# Patient Record
Sex: FEMALE | Race: WHITE | NOT HISPANIC OR LATINO | URBAN - METROPOLITAN AREA
[De-identification: names, ages, dates, MRNs, and addresses within clinical notes are randomized per-mention and may not be internally consistent; named-entity substitution may affect disease eponyms.]

---

## 2023-08-22 LAB
HEPATITIS B VIRUS SURFACE AG PRESENCE IN SERUM: NONREACTIVE
HERPES SIMPLEX VIRUS 1 IGG: >8 INDEX
HERPES SIMPLEX VIRUS 2 IGG: <0.2 INDEX
HIV 1/ 2 AG/AB SCREEN: NONREACTIVE
SYPHILIS TOTAL AB: NONREACTIVE

## 2023-08-23 LAB
CHLAMYDIA TRACH., AMPLIFIED: NEGATIVE
N. GONORRHEA, AMPLIFIED: NEGATIVE

## 2023-08-29 LAB — HERPES SIMPLEX VIRUS I/II ANTIBODY, IGM: 1.15 IV

## 2024-10-02 PROBLEM — Z97.5 IUD (INTRAUTERINE DEVICE) IN PLACE: Status: ACTIVE | Noted: 2024-10-02

## 2024-10-02 PROBLEM — R87.610 ASCUS WITH POSITIVE HIGH RISK HPV CERVICAL: Status: ACTIVE | Noted: 2024-10-02

## 2024-10-02 PROBLEM — B00.2 ORAL HERPES: Status: ACTIVE | Noted: 2024-10-02

## 2024-10-02 PROBLEM — R87.810 ASCUS WITH POSITIVE HIGH RISK HPV CERVICAL: Status: ACTIVE | Noted: 2024-10-02

## 2024-10-02 PROBLEM — Z01.419 WELL WOMAN EXAM WITH ROUTINE GYNECOLOGICAL EXAM: Status: ACTIVE | Noted: 2024-10-02

## 2024-10-02 NOTE — PROGRESS NOTES
"Subjective   Patient ID: Astrid Easton is a 59 y.o. female who presents for Annual Exam (External warts ).  She presents today for annual exam with pap test. Her medical history is complicated by adult polycystic kidney disease. No recent mammogram is found in the EMR.    She has a history of condylomata successfully treated with TCA. She believes these may have returned and requests treatment again. They have been present for a few months.    9/6/2023 pap ASCUS. +HPV.  9/27/2023 colposcopy was without visible lesions and ECC returned benign.    She has had a Mirena IUD since 3/15/2018 due to past elevated estrogen levels despite menopause. This was noted to be in normal position on ultrasound in October 2020 and the uterus had a normal appearance without fibroids. She continues to take this \"Biogenistein Ultra\" oral supplement.           Review of Systems   Constitutional:  Negative for activity change.   HENT:  Negative for congestion.    Respiratory:  Negative for apnea and cough.    Cardiovascular:  Negative for chest pain.   Gastrointestinal:  Negative for constipation and diarrhea.   Genitourinary:  Negative for hematuria and vaginal pain.   Musculoskeletal:  Negative for joint swelling.   Neurological:  Negative for dizziness.   Psychiatric/Behavioral:  Negative for agitation.        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     History of adult polycystic kidney disease    Polycystic dysplastic kidney       Past Surgical History:   Procedure Laterality Date    OTHER SURGICAL HISTORY  01/27/2020    Tonsillectomy      Allergies   Allergen Reactions    Penicillins Palpitations, Other and Unknown     Heavy chest/breathing issues    Chest tightness      Current Outpatient Medications on File Prior to Visit   Medication Sig Dispense Refill    Jynarque 60 mg (AM)/ 30 mg (PM) tablets, sequential Take 60 mg by mouth " every morning, and take 30 mg by mouth 8 hours later every day      lisinopril 2.5 mg tablet Take 1 tablet (2.5 mg) by mouth once daily.      calcium carbonate-mag hydroxid 400-135 mg/5 mL suspension       cholecalciferol (Vitamin D-3) 5,000 Units tablet Take 1 tablet (5,000 Units) by mouth once daily.      coenzyme Q-10 200 mg capsule every 12 hours.      levonorgestrel (Mirena) 21 mcg/24 hr (8 yrs) 52 mg IUD by intrauterine route.      omega 3-dha-epa-fish oil (Fish OiL) 1,200 (144-216) mg capsule every 12 hours.      [DISCONTINUED] omega 3-dha-epa-fish oil 100-400-1,000 mg capsule Take 2 g by mouth.       No current facility-administered medications on file prior to visit.        Objective   Physical Exam  Constitutional:       Appearance: Normal appearance.   Neck:      Thyroid: No thyromegaly.   Cardiovascular:      Rate and Rhythm: Normal rate and regular rhythm.      Heart sounds: Normal heart sounds.   Pulmonary:      Effort: Pulmonary effort is normal.      Breath sounds: Normal breath sounds.   Chest:      Chest wall: No mass.   Breasts:     Right: Normal. No inverted nipple, mass, nipple discharge or skin change.      Left: Normal. No inverted nipple, mass, nipple discharge or skin change.   Abdominal:      General: There is no distension.      Palpations: Abdomen is soft. There is no mass.      Tenderness: There is no abdominal tenderness.      Comments: Enlarged kidneys are palpated on abdominal exam.    Genitourinary:     General: Normal vulva.      Exam position: Lithotomy position.      Labia:         Right: No rash.         Left: No rash.       Urethra: No urethral lesion.      Vagina: Normal. No lesions.      Cervix: No friability or lesion.      Uterus: Normal. Not enlarged and not tender.       Adnexa: Right adnexa normal and left adnexa normal.        Right: No mass or tenderness.          Left: No mass or tenderness.            Comments: TCA was applied to several condylomata.  IUD string is  visualized.   Musculoskeletal:         General: No deformity.      Cervical back: Neck supple.   Lymphadenopathy:      Cervical: No cervical adenopathy.   Skin:     General: Skin is warm and dry.      Findings: No rash.   Neurological:      General: No focal deficit present.      Mental Status: She is alert.   Psychiatric:         Mood and Affect: Mood normal.         Behavior: Behavior is cooperative.         Thought Content: Thought content normal.     Patient ID: Astrid Easton is a 59 y.o. female.    Biopsy vulva    Date/Time: 10/8/2024 9:46 AM    Performed by: Shannon Munroe MD  Authorized by: Shannon Munroe MD    Procedure Overview:     Procedure type comment:  TCA to condylomata  Consent:     Consent obtained:  Written    Consent given by:  Patient  Indication:     Indications: lesion      Indications comment:  Condylomata of labia majora  Pre-procedure Details:     Premeds:  None    Topical anesthetic:  None    Local anesthetic:  None  Procedure Details:     Location 1 comment:  Condylomata were treated with TCA until white color appeared. Gel was then applied for comfort.  Post-procedure Details:     Patient tolerance of procedure:  Tolerated well, no immediate complications  Findings:     Impression: condyloma          Problem List Items Addressed This Visit       Well woman exam with routine gynecological exam - Primary    Overview     9/6/2023 pap returned ASCUS, +HPV.  She has a history of condylomata successfully treated with TCA.          Current Assessment & Plan     Pap is sent today. Plan colposcopy if abnormal.  Mammogram is ordered.  Regular exercise and attaining/maintaining a healthy weight is encouraged.   Adequate calcium intake with diet or supplements is encouraged.    We will notify of any abnormal results.          Relevant Orders    THINPREP PAP    IUD (intrauterine device) in place    Overview     She has had a Mirena IUD since 3/15/2018 due to past elevated estrogen  levels despite menopause. This was noted to be in normal position on ultrasound in October 2020 and the uterus had a normal appearance without fibroids.          Hyperestrogenism    Overview     She has had elevated estrogen levels in menopause associated with herbal supplementation. Mirena IUD was inserted in 2018 to help protect the endometrium.         Current Assessment & Plan     FSH and estradiol are ordered today. If still elevated will plan IUD exchange.          Relevant Orders    Follicle Stimulating Hormone    Estradiol    Estrogens, Total    Condylomata acuminata in female    Overview     Recurrent vulvar warts.          Current Assessment & Plan     TCA is applied to lesions. Plan follow up in 2 weeks for repeat application if needed.          Relevant Orders    Biopsy vulva    ASCUS with positive high risk HPV cervical    Overview     She has a history of condylomata successfully treated with TCA.   9/6/2023 pap ASCUS. +HPV.  9/27/2023 colposcopy was without visible lesions and ECC returned benign.          Current Assessment & Plan     Pap is sent today. Plan colposcopy if abnormal.         Relevant Orders    THINPREP PAP     Other Visit Diagnoses       Encounter for screening mammogram for malignant neoplasm of breast        Relevant Orders    BI mammo bilateral screening tomosynthesis

## 2024-10-02 NOTE — ASSESSMENT & PLAN NOTE
Pap is sent today. Plan colposcopy if abnormal.  Mammogram is ordered.  Regular exercise and attaining/maintaining a healthy weight is encouraged.   Adequate calcium intake with diet or supplements is encouraged.    We will notify of any abnormal results.

## 2024-10-08 ENCOUNTER — APPOINTMENT (OUTPATIENT)
Dept: OBSTETRICS AND GYNECOLOGY | Facility: CLINIC | Age: 59
End: 2024-10-08
Payer: COMMERCIAL

## 2024-10-08 VITALS
SYSTOLIC BLOOD PRESSURE: 154 MMHG | BODY MASS INDEX: 25.58 KG/M2 | DIASTOLIC BLOOD PRESSURE: 100 MMHG | WEIGHT: 163 LBS | HEIGHT: 67 IN

## 2024-10-08 DIAGNOSIS — R87.810 ASCUS WITH POSITIVE HIGH RISK HPV CERVICAL: ICD-10-CM

## 2024-10-08 DIAGNOSIS — Z01.419 WELL WOMAN EXAM WITH ROUTINE GYNECOLOGICAL EXAM: Primary | ICD-10-CM

## 2024-10-08 DIAGNOSIS — A63.0 CONDYLOMATA ACUMINATA IN FEMALE: ICD-10-CM

## 2024-10-08 DIAGNOSIS — Z97.5 IUD (INTRAUTERINE DEVICE) IN PLACE: ICD-10-CM

## 2024-10-08 DIAGNOSIS — R87.610 ASCUS WITH POSITIVE HIGH RISK HPV CERVICAL: ICD-10-CM

## 2024-10-08 DIAGNOSIS — Z12.31 ENCOUNTER FOR SCREENING MAMMOGRAM FOR MALIGNANT NEOPLASM OF BREAST: ICD-10-CM

## 2024-10-08 DIAGNOSIS — E28.0 HYPERESTROGENISM: ICD-10-CM

## 2024-10-08 PROCEDURE — 56515 DESTROY VULVA LESION/S COMPL: CPT | Performed by: OBSTETRICS & GYNECOLOGY

## 2024-10-08 PROCEDURE — 99396 PREV VISIT EST AGE 40-64: CPT | Performed by: OBSTETRICS & GYNECOLOGY

## 2024-10-08 PROCEDURE — 3008F BODY MASS INDEX DOCD: CPT | Performed by: OBSTETRICS & GYNECOLOGY

## 2024-10-08 PROCEDURE — 87624 HPV HI-RISK TYP POOLED RSLT: CPT

## 2024-10-08 RX ORDER — ACETAMINOPHEN 160 MG/5ML
SUSPENSION, ORAL (FINAL DOSE FORM) ORAL EVERY 12 HOURS
COMMUNITY

## 2024-10-08 RX ORDER — NAPROXEN SODIUM 220 MG/1
TABLET ORAL EVERY 12 HOURS
COMMUNITY

## 2024-10-08 RX ORDER — ACETAMINOPHEN 500 MG
5000 TABLET ORAL DAILY
COMMUNITY

## 2024-10-08 RX ORDER — OMEGA-3/DHA/EPA/FISH OIL 100-400 MG
2 CAPSULE ORAL
COMMUNITY
End: 2024-10-08 | Stop reason: WASHOUT

## 2024-10-08 RX ORDER — LEVONORGESTREL 52 MG/1
INTRAUTERINE DEVICE INTRAUTERINE
COMMUNITY

## 2024-10-08 RX ORDER — LISINOPRIL 2.5 MG/1
2.5 TABLET ORAL
COMMUNITY
Start: 2024-07-26 | End: 2025-04-22

## 2024-10-08 RX ORDER — TOLVAPTAN 60 MG-30MG
KIT ORAL
COMMUNITY
Start: 2024-09-23

## 2024-10-08 ASSESSMENT — ENCOUNTER SYMPTOMS
CONSTIPATION: 0
ACTIVITY CHANGE: 0
HEMATURIA: 0
AGITATION: 0
DIZZINESS: 0
DIARRHEA: 0
JOINT SWELLING: 0
APNEA: 0
COUGH: 0

## 2024-10-21 LAB
CYTOLOGY CMNT CVX/VAG CYTO-IMP: NORMAL
HPV HR 12 DNA GENITAL QL NAA+PROBE: POSITIVE
HPV HR GENOTYPES PNL CVX NAA+PROBE: POSITIVE
HPV16 DNA SPEC QL NAA+PROBE: NEGATIVE
HPV18 DNA SPEC QL NAA+PROBE: NEGATIVE
LAB AP HPV GENOTYPE QUESTION: YES
LAB AP HPV HR: NORMAL
LAB AP PREVIOUS ABNORMAL HISTORY: NORMAL
LABORATORY COMMENT REPORT: NORMAL
LABORATORY COMMENT REPORT: NORMAL
PATH REPORT.TOTAL CANCER: NORMAL

## 2024-10-24 ENCOUNTER — TELEPHONE (OUTPATIENT)
Dept: OBSTETRICS AND GYNECOLOGY | Facility: CLINIC | Age: 59
End: 2024-10-24
Payer: COMMERCIAL

## 2024-10-24 NOTE — TELEPHONE ENCOUNTER
----- Message from Shannon Munroe sent at 10/21/2024  1:48 PM EDT -----  Pap returned with normal cytology, but HPV is present. I would like to proceed with colposcopy to further assess the cervix.

## 2024-10-30 ENCOUNTER — APPOINTMENT (OUTPATIENT)
Dept: OBSTETRICS AND GYNECOLOGY | Facility: CLINIC | Age: 59
End: 2024-10-30
Payer: COMMERCIAL

## 2024-10-30 VITALS — BODY MASS INDEX: 26.55 KG/M2 | SYSTOLIC BLOOD PRESSURE: 124 MMHG | DIASTOLIC BLOOD PRESSURE: 80 MMHG | WEIGHT: 167 LBS

## 2024-10-30 DIAGNOSIS — N95.2 VAGINAL ATROPHY: ICD-10-CM

## 2024-10-30 DIAGNOSIS — R87.810 ASCUS WITH POSITIVE HIGH RISK HPV CERVICAL: ICD-10-CM

## 2024-10-30 DIAGNOSIS — R87.610 ASCUS WITH POSITIVE HIGH RISK HPV CERVICAL: ICD-10-CM

## 2024-10-30 DIAGNOSIS — Z30.433 ENCOUNTER FOR REMOVAL AND REINSERTION OF INTRAUTERINE CONTRACEPTIVE DEVICE (IUD): Primary | ICD-10-CM

## 2024-10-30 DIAGNOSIS — A63.0 CONDYLOMATA ACUMINATA IN FEMALE: ICD-10-CM

## 2024-10-30 RX ORDER — ESTRADIOL 0.1 MG/G
1 CREAM VAGINAL DAILY
Qty: 42.5 G | Refills: 12 | Status: SHIPPED | OUTPATIENT
Start: 2024-10-30 | End: 2025-10-30

## 2024-10-30 ASSESSMENT — ENCOUNTER SYMPTOMS
ACTIVITY CHANGE: 0
HEMATURIA: 0
DIZZINESS: 0
AGITATION: 0
CONSTIPATION: 0
APNEA: 0
DIARRHEA: 0
COUGH: 0
JOINT SWELLING: 0

## 2024-11-06 LAB
LABORATORY COMMENT REPORT: NORMAL
PATH REPORT.FINAL DX SPEC: NORMAL
PATH REPORT.GROSS SPEC: NORMAL
PATH REPORT.RELEVANT HX SPEC: NORMAL
PATH REPORT.TOTAL CANCER: NORMAL

## 2024-11-08 ENCOUNTER — TELEPHONE (OUTPATIENT)
Dept: OBSTETRICS AND GYNECOLOGY | Facility: CLINIC | Age: 59
End: 2024-11-08
Payer: COMMERCIAL

## 2024-11-08 NOTE — TELEPHONE ENCOUNTER
----- Message from Shannon Munroe sent at 11/7/2024 11:56 AM EST -----  Pathology returned benign. We will keep plan for pap in 1 year.

## 2024-11-29 PROBLEM — Z30.431 IUD CHECK UP: Status: ACTIVE | Noted: 2024-10-02

## 2024-11-30 NOTE — PROGRESS NOTES
"Subjective   Patient ID: Astrid Easton is a 59 y.o. female who presents for IUD check .  10/8/2024 documentation:  She presents today for annual exam with pap test. Her medical history is complicated by adult polycystic kidney disease. No recent mammogram is found in the EMR.    She has a history of condylomata successfully treated with TCA. She believes these may have returned and requests treatment again. They have been present for a few months.    9/6/2023 pap ASCUS. +HPV.  9/27/2023 colposcopy was without visible lesions and ECC returned benign.    She has had a Mirena IUD since 3/15/2018 due to past elevated estrogen levels despite menopause. This was noted to be in normal position on ultrasound in October 2020 and the uterus had a normal appearance without fibroids. She continues to take this \"Biogenistein Ultra\" oral supplement.     10/30/2024:  She presents today for possible repeat TCA application for warts. She feels there are two small warts remaining.   IUD exchange is also desired. She is using this for progestin suppression of the endometrium due to past elevated estrogen levels and \"natural\" hormone supplementation.  10/8./2024 pap returned with negative cytology, +HPV. Colposcopy is needed today also.    She has new concern for discomfort and dryness and burning with intercourse. After discussion she desires to try vaginal estrogen.     12/3/2024:  She presents for recheck. Mirena IUD was exchanged on 10/30/2024. Colposcopy was also performed at last visit with no visible lesions noted. ECC returned without dysplasia and pap is planned for in 1 year.   At last visit she was prescribed vaginal estrogen for dryness and discomfort.           Review of Systems   Constitutional:  Negative for activity change.   HENT:  Negative for congestion.    Respiratory:  Negative for apnea and cough.    Cardiovascular:  Negative for chest pain.   Gastrointestinal:  Negative for constipation and diarrhea. "   Genitourinary:  Negative for hematuria and vaginal pain.   Musculoskeletal:  Negative for joint swelling.   Neurological:  Negative for dizziness.   Psychiatric/Behavioral:  Negative for agitation.        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      coenzyme Q-10 200 mg capsule every 12 hours.      estradiol (Estrace) 0.01 % (0.1 mg/gram) vaginal cream Insert 0.25 Applicatorfuls (1 g) into the vagina once daily. Decrease to twice weekly after two weeks. You may choose to simply apply to the vaginal opening. 42.5 g 12    Jynarque 60 mg (AM)/ 30 mg (PM) tablets, sequential Take 60 mg by mouth every morning, and take 30 mg by mouth 8 hours later every day      levonorgestrel (Mirena) 21 mcg/24 hr (8 yrs) 52 mg IUD by intrauterine route.      lisinopril 2.5 mg tablet Take 1 tablet (2.5 mg) by mouth once daily.      omega 3-dha-epa-fish oil (Fish OiL) 1,200 (144-216) mg capsule every 12 hours.       No current facility-administered medications on file prior to visit.        Objective   Physical Exam  Constitutional:       Appearance: Normal appearance.   Abdominal:      Palpations: Abdomen is soft.   Genitourinary:     General: Normal vulva.      Exam position: Lithotomy position.      Labia:         Right: No lesion.         Left: No lesion.       Urethra: No urethral lesion.       Vagina: Normal. No lesions.      Cervix: No friability or lesion.      Uterus: Normal. Not enlarged and not tender.       Adnexa: Right adnexa normal and left adnexa normal.        Right: No mass or tenderness.          Left: No mass or tenderness.        Comments: IUD strings are noted.   No condyloma are noted.  Skin:     General: Skin is warm and dry.   Neurological:      Mental Status: She is alert.           Problem List Items Addressed This Visit       Vaginal atrophy - Primary    Overview     Vaginal estrogen cream is prescribed for vaginal dryness and discomfort with intercourse.         IUD check up    Overview     She has had a Mirena IUD since 3/15/2018 due to past elevated estrogen levels despite menopause. This was noted to be in normal position on ultrasound in October 2020 and the uterus had a normal appearance without fibroids.   Mirena IUD is exchanged on 10/30/2024.         Current Assessment & Plan     IUD appears in position. Follow up as needed.

## 2024-12-04 ENCOUNTER — APPOINTMENT (OUTPATIENT)
Dept: OBSTETRICS AND GYNECOLOGY | Facility: CLINIC | Age: 59
End: 2024-12-04
Payer: COMMERCIAL

## 2024-12-04 VITALS — DIASTOLIC BLOOD PRESSURE: 96 MMHG | BODY MASS INDEX: 25.44 KG/M2 | SYSTOLIC BLOOD PRESSURE: 140 MMHG | WEIGHT: 160 LBS

## 2024-12-04 DIAGNOSIS — Z30.431 IUD CHECK UP: ICD-10-CM

## 2024-12-04 DIAGNOSIS — N95.2 VAGINAL ATROPHY: Primary | ICD-10-CM

## 2024-12-04 PROCEDURE — 99213 OFFICE O/P EST LOW 20 MIN: CPT | Performed by: OBSTETRICS & GYNECOLOGY

## 2024-12-04 ASSESSMENT — ENCOUNTER SYMPTOMS
COUGH: 0
DIZZINESS: 0
JOINT SWELLING: 0
CONSTIPATION: 0
DIARRHEA: 0
HEMATURIA: 0
APNEA: 0
AGITATION: 0
ACTIVITY CHANGE: 0

## 2026-01-13 ENCOUNTER — APPOINTMENT (OUTPATIENT)
Dept: OBSTETRICS AND GYNECOLOGY | Facility: CLINIC | Age: 61
End: 2026-01-13
Payer: COMMERCIAL